# Patient Record
Sex: MALE | Race: OTHER | Employment: FULL TIME | URBAN - NONMETROPOLITAN AREA
[De-identification: names, ages, dates, MRNs, and addresses within clinical notes are randomized per-mention and may not be internally consistent; named-entity substitution may affect disease eponyms.]

---

## 2017-01-10 ENCOUNTER — OFFICE VISIT (OUTPATIENT)
Dept: PRIMARY CARE CLINIC | Age: 27
End: 2017-01-10

## 2017-01-10 VITALS
HEART RATE: 80 BPM | WEIGHT: 188.6 LBS | HEIGHT: 72 IN | BODY MASS INDEX: 25.55 KG/M2 | RESPIRATION RATE: 16 BRPM | SYSTOLIC BLOOD PRESSURE: 126 MMHG | DIASTOLIC BLOOD PRESSURE: 78 MMHG | TEMPERATURE: 98.4 F | OXYGEN SATURATION: 99 %

## 2017-01-10 DIAGNOSIS — S29.012A UPPER BACK STRAIN, INITIAL ENCOUNTER: Primary | ICD-10-CM

## 2017-01-10 PROCEDURE — 99213 OFFICE O/P EST LOW 20 MIN: CPT | Performed by: NURSE PRACTITIONER

## 2017-01-10 RX ORDER — CYCLOBENZAPRINE HCL 10 MG
10 TABLET ORAL 3 TIMES DAILY PRN
Qty: 30 TABLET | Refills: 0 | Status: SHIPPED | OUTPATIENT
Start: 2017-01-10 | End: 2018-08-03 | Stop reason: ALTCHOICE

## 2017-01-10 RX ORDER — PREDNISONE 20 MG/1
20 TABLET ORAL DAILY
Qty: 10 TABLET | Refills: 0 | Status: SHIPPED | OUTPATIENT
Start: 2017-01-10 | End: 2017-01-20

## 2017-01-10 ASSESSMENT — ENCOUNTER SYMPTOMS
BACK PAIN: 1
RESPIRATORY NEGATIVE: 1

## 2018-05-09 ENCOUNTER — HOSPITAL ENCOUNTER (OUTPATIENT)
Dept: LAB | Age: 28
Setting detail: SPECIMEN
Discharge: HOME | End: 2018-05-09
Payer: COMMERCIAL

## 2018-05-09 ENCOUNTER — OFFICE VISIT (OUTPATIENT)
Dept: FAMILY MEDICINE CLINIC | Age: 28
End: 2018-05-09
Payer: COMMERCIAL

## 2018-05-09 VITALS
DIASTOLIC BLOOD PRESSURE: 60 MMHG | HEIGHT: 72 IN | BODY MASS INDEX: 24.24 KG/M2 | SYSTOLIC BLOOD PRESSURE: 100 MMHG | HEART RATE: 72 BPM | WEIGHT: 179 LBS

## 2018-05-09 DIAGNOSIS — Z00.00 WELL ADULT EXAM: ICD-10-CM

## 2018-05-09 DIAGNOSIS — F41.0 PANIC ATTACK: ICD-10-CM

## 2018-05-09 DIAGNOSIS — Z13.220 SCREENING, LIPID: ICD-10-CM

## 2018-05-09 DIAGNOSIS — Z11.4 SCREENING FOR HIV (HUMAN IMMUNODEFICIENCY VIRUS): ICD-10-CM

## 2018-05-09 DIAGNOSIS — F41.9 ANXIETY: ICD-10-CM

## 2018-05-09 DIAGNOSIS — Z00.00 WELL ADULT EXAM: Primary | ICD-10-CM

## 2018-05-09 LAB
ALBUMIN SERPL-MCNC: 5.1 G/DL (ref 3.5–5.2)
ALBUMIN/GLOBULIN RATIO: 1.8 (ref 1–2.5)
ALP BLD-CCNC: 71 U/L (ref 40–129)
ALT SERPL-CCNC: 14 U/L (ref 5–41)
ANION GAP SERPL CALCULATED.3IONS-SCNC: 12 MMOL/L (ref 9–17)
AST SERPL-CCNC: 13 U/L
BILIRUB SERPL-MCNC: 1.99 MG/DL (ref 0.3–1.2)
BUN BLDV-MCNC: 9 MG/DL (ref 6–20)
BUN/CREAT BLD: 11 (ref 9–20)
CALCIUM SERPL-MCNC: 9.6 MG/DL (ref 8.6–10.4)
CHLORIDE BLD-SCNC: 97 MMOL/L (ref 98–107)
CHOLESTEROL/HDL RATIO: 3.2
CHOLESTEROL: 158 MG/DL
CO2: 30 MMOL/L (ref 20–31)
CREAT SERPL-MCNC: 0.8 MG/DL (ref 0.7–1.2)
GFR AFRICAN AMERICAN: >60 ML/MIN
GFR NON-AFRICAN AMERICAN: >60 ML/MIN
GFR SERPL CREATININE-BSD FRML MDRD: ABNORMAL ML/MIN/{1.73_M2}
GFR SERPL CREATININE-BSD FRML MDRD: ABNORMAL ML/MIN/{1.73_M2}
GLUCOSE BLD-MCNC: 89 MG/DL (ref 70–99)
HDLC SERPL-MCNC: 49 MG/DL
HIV AG/AB: NONREACTIVE
LDL CHOLESTEROL: 86 MG/DL (ref 0–130)
POTASSIUM SERPL-SCNC: 3.8 MMOL/L (ref 3.7–5.3)
SODIUM BLD-SCNC: 139 MMOL/L (ref 135–144)
TOTAL PROTEIN: 7.9 G/DL (ref 6.4–8.3)
TRIGL SERPL-MCNC: 114 MG/DL
VLDLC SERPL CALC-MCNC: NORMAL MG/DL (ref 1–30)

## 2018-05-09 PROCEDURE — 87389 HIV-1 AG W/HIV-1&-2 AB AG IA: CPT

## 2018-05-09 PROCEDURE — 99395 PREV VISIT EST AGE 18-39: CPT | Performed by: PHYSICIAN ASSISTANT

## 2018-05-09 PROCEDURE — 80053 COMPREHEN METABOLIC PANEL: CPT

## 2018-05-09 PROCEDURE — 80061 LIPID PANEL: CPT

## 2018-05-09 PROCEDURE — 36415 COLL VENOUS BLD VENIPUNCTURE: CPT

## 2018-05-09 RX ORDER — HYDROXYZINE PAMOATE 25 MG/1
25 CAPSULE ORAL 3 TIMES DAILY PRN
Qty: 30 CAPSULE | Refills: 1 | Status: SHIPPED | OUTPATIENT
Start: 2018-05-09 | End: 2022-01-12 | Stop reason: SDUPTHER

## 2018-05-09 RX ORDER — CITALOPRAM 20 MG/1
20 TABLET ORAL DAILY
Qty: 30 TABLET | Refills: 3 | Status: SHIPPED | OUTPATIENT
Start: 2018-05-09 | End: 2018-08-03 | Stop reason: SDUPTHER

## 2018-05-09 ASSESSMENT — PATIENT HEALTH QUESTIONNAIRE - PHQ9
1. LITTLE INTEREST OR PLEASURE IN DOING THINGS: 0
SUM OF ALL RESPONSES TO PHQ QUESTIONS 1-9: 0
SUM OF ALL RESPONSES TO PHQ9 QUESTIONS 1 & 2: 0
2. FEELING DOWN, DEPRESSED OR HOPELESS: 0

## 2018-05-11 ENCOUNTER — PATIENT MESSAGE (OUTPATIENT)
Dept: FAMILY MEDICINE CLINIC | Age: 28
End: 2018-05-11

## 2018-05-29 ENCOUNTER — PATIENT MESSAGE (OUTPATIENT)
Dept: FAMILY MEDICINE CLINIC | Age: 28
End: 2018-05-29

## 2018-08-03 ENCOUNTER — OFFICE VISIT (OUTPATIENT)
Dept: PRIMARY CARE CLINIC | Age: 28
End: 2018-08-03
Payer: COMMERCIAL

## 2018-08-03 VITALS
WEIGHT: 181 LBS | OXYGEN SATURATION: 98 % | RESPIRATION RATE: 16 BRPM | BODY MASS INDEX: 24.52 KG/M2 | HEIGHT: 72 IN | SYSTOLIC BLOOD PRESSURE: 130 MMHG | HEART RATE: 66 BPM | TEMPERATURE: 98.1 F | DIASTOLIC BLOOD PRESSURE: 70 MMHG

## 2018-08-03 DIAGNOSIS — F32.A DEPRESSION, UNSPECIFIED DEPRESSION TYPE: Primary | ICD-10-CM

## 2018-08-03 PROCEDURE — 99213 OFFICE O/P EST LOW 20 MIN: CPT | Performed by: FAMILY MEDICINE

## 2018-08-03 RX ORDER — CITALOPRAM 20 MG/1
20 TABLET ORAL DAILY
Qty: 30 TABLET | Refills: 2 | Status: SHIPPED | OUTPATIENT
Start: 2018-08-03 | End: 2018-11-01

## 2018-08-03 ASSESSMENT — ENCOUNTER SYMPTOMS
RESPIRATORY NEGATIVE: 1
EYES NEGATIVE: 1
ABDOMINAL PAIN: 0

## 2018-08-03 ASSESSMENT — PATIENT HEALTH QUESTIONNAIRE - PHQ9
2. FEELING DOWN, DEPRESSED OR HOPELESS: 2
SUM OF ALL RESPONSES TO PHQ QUESTIONS 1-9: 2
1. LITTLE INTEREST OR PLEASURE IN DOING THINGS: 0
SUM OF ALL RESPONSES TO PHQ9 QUESTIONS 1 & 2: 2

## 2018-08-03 NOTE — PROGRESS NOTES
distress. HENT:   Head: Normocephalic and atraumatic. Eyes: Conjunctivae are normal.   Neck: Normal range of motion. Pulmonary/Chest: Effort normal.   Neurological: He is alert and oriented to person, place, and time. Skin: Skin is warm and dry. No rash noted. He is not diaphoretic. No erythema. No pallor. Psychiatric: His behavior is normal. Judgment and thought content normal. He exhibits a depressed mood. Nursing note and vitals reviewed. Assessment:      Encounter Diagnosis   Name Primary?  Depression, unspecified depression type Yes           Plan:      Orders Placed This Encounter   Medications    citalopram (CELEXA) 20 MG tablet     Sig: Take 1 tablet by mouth daily     Dispense:  30 tablet     Refill:  2     Would recommend restarting citalopram as treatment for depression. He seemed to do well with this medical therapy. He is advised to stay on medication for 3 months duration. Patient can follow up with PCP if persistent issues. Could consider counseling if needed. Return  if no improvement in symptoms or if any further symptoms arise.

## 2018-08-21 ENCOUNTER — OFFICE VISIT (OUTPATIENT)
Dept: PRIMARY CARE CLINIC | Age: 28
End: 2018-08-21
Payer: COMMERCIAL

## 2018-08-21 ENCOUNTER — HOSPITAL ENCOUNTER (OUTPATIENT)
Age: 28
Setting detail: SPECIMEN
Discharge: HOME | End: 2018-08-21
Payer: COMMERCIAL

## 2018-08-21 VITALS
HEART RATE: 84 BPM | DIASTOLIC BLOOD PRESSURE: 80 MMHG | HEIGHT: 72 IN | BODY MASS INDEX: 24.52 KG/M2 | OXYGEN SATURATION: 99 % | TEMPERATURE: 97.9 F | SYSTOLIC BLOOD PRESSURE: 138 MMHG | WEIGHT: 181 LBS

## 2018-08-21 DIAGNOSIS — Z11.3 SCREENING FOR STD (SEXUALLY TRANSMITTED DISEASE): ICD-10-CM

## 2018-08-21 DIAGNOSIS — F32.A DEPRESSION, UNSPECIFIED DEPRESSION TYPE: ICD-10-CM

## 2018-08-21 DIAGNOSIS — Z11.3 SCREENING FOR STD (SEXUALLY TRANSMITTED DISEASE): Primary | ICD-10-CM

## 2018-08-21 PROCEDURE — 86780 TREPONEMA PALLIDUM: CPT

## 2018-08-21 PROCEDURE — 86803 HEPATITIS C AB TEST: CPT

## 2018-08-21 PROCEDURE — 87591 N.GONORRHOEAE DNA AMP PROB: CPT

## 2018-08-21 PROCEDURE — 87491 CHLMYD TRACH DNA AMP PROBE: CPT

## 2018-08-21 PROCEDURE — 36415 COLL VENOUS BLD VENIPUNCTURE: CPT

## 2018-08-21 PROCEDURE — 87340 HEPATITIS B SURFACE AG IA: CPT

## 2018-08-21 PROCEDURE — 99213 OFFICE O/P EST LOW 20 MIN: CPT | Performed by: FAMILY MEDICINE

## 2018-08-21 PROCEDURE — 87389 HIV-1 AG W/HIV-1&-2 AB AG IA: CPT

## 2018-08-22 LAB
HEPATITIS B SURFACE ANTIGEN: NONREACTIVE
HEPATITIS C ANTIBODY: NONREACTIVE
HIV AG/AB: NONREACTIVE
T. PALLIDUM, IGG: NONREACTIVE

## 2018-08-22 ASSESSMENT — ENCOUNTER SYMPTOMS
EYES NEGATIVE: 1
RESPIRATORY NEGATIVE: 1
GASTROINTESTINAL NEGATIVE: 1
ABDOMINAL PAIN: 0

## 2018-08-22 NOTE — PROGRESS NOTES
Subjective:      Patient ID: Milton Howard is a 24 y.o. male. Exposure to STD   Primary symptoms comment: patient is completely asymptomatic. Has been sexually active with new partners since breaking up with his girlfriend and would like to be tested for STDs. No known exposure. This is a new problem. Pertinent negatives include no abdominal pain, frequency, hematuria, hesitancy, painful intercourse or urgency. He has tried nothing for the symptoms. I had seen patient about a month ago secondary to his breakup with his girlfriend. At that time he was having increased issues with depression. We had restarted back on citalopram and he does note that he is doing better with this medical therapy. Is not having any further issues with significant depression symptoms. Tolerating the medication without difficulty. Did review patient's med list, allergies, social history,pmhx and pshx today as noted in the record. Review of Systems   Constitutional: Negative. HENT: Negative. Eyes: Negative. Respiratory: Negative. Cardiovascular: Negative. Gastrointestinal: Negative. Negative for abdominal pain. Genitourinary: Negative for frequency, hesitancy and urgency. Musculoskeletal: Negative. Skin: Negative. Neurological: Negative. Psychiatric/Behavioral: Negative. Objective:   Physical Exam   Constitutional: He is oriented to person, place, and time. He appears well-developed and well-nourished. No distress. HENT:   Head: Normocephalic and atraumatic. Eyes: Conjunctivae are normal.   Neck: Normal range of motion. Pulmonary/Chest: Effort normal.   Neurological: He is alert and oriented to person, place, and time. Skin: Skin is warm and dry. No rash noted. He is not diaphoretic. No erythema. No pallor. Psychiatric: He has a normal mood and affect. His behavior is normal. Judgment and thought content normal.   Nursing note and vitals reviewed.       Assessment:

## 2018-08-23 LAB
C. TRACHOMATIS DNA ,URINE: NEGATIVE
N. GONORRHOEAE DNA, URINE: NEGATIVE

## 2018-10-10 ENCOUNTER — E-VISIT (OUTPATIENT)
Dept: FAMILY MEDICINE CLINIC | Age: 28
End: 2018-10-10
Payer: COMMERCIAL

## 2018-10-10 DIAGNOSIS — R09.82 POST-NASAL DRIP: Primary | ICD-10-CM

## 2018-10-10 PROCEDURE — 99444 PR PHYSICIAN ONLINE EVALUATION & MANAGEMENT SERVICE: CPT | Performed by: FAMILY MEDICINE

## 2018-10-10 ASSESSMENT — LIFESTYLE VARIABLES
SMOKING_STATUS: SOMETIMES
HISTORY_OF_SMOKING: I SMOKED IN THE PAST, BUT NOT REGULARY

## 2018-11-01 ENCOUNTER — OFFICE VISIT (OUTPATIENT)
Dept: PRIMARY CARE CLINIC | Age: 28
End: 2018-11-01
Payer: COMMERCIAL

## 2018-11-01 VITALS
OXYGEN SATURATION: 99 % | HEART RATE: 84 BPM | DIASTOLIC BLOOD PRESSURE: 86 MMHG | TEMPERATURE: 98.9 F | HEIGHT: 72 IN | BODY MASS INDEX: 24.95 KG/M2 | WEIGHT: 184.2 LBS | RESPIRATION RATE: 16 BRPM | SYSTOLIC BLOOD PRESSURE: 136 MMHG

## 2018-11-01 DIAGNOSIS — H65.93 BILATERAL SEROUS OTITIS MEDIA, UNSPECIFIED CHRONICITY: Primary | ICD-10-CM

## 2018-11-01 DIAGNOSIS — R42 DIZZINESS: ICD-10-CM

## 2018-11-01 PROCEDURE — 93000 ELECTROCARDIOGRAM COMPLETE: CPT | Performed by: FAMILY MEDICINE

## 2018-11-01 PROCEDURE — 99214 OFFICE O/P EST MOD 30 MIN: CPT | Performed by: FAMILY MEDICINE

## 2018-11-01 RX ORDER — PREDNISONE 20 MG/1
20 TABLET ORAL 2 TIMES DAILY
Qty: 6 TABLET | Refills: 0 | Status: SHIPPED | OUTPATIENT
Start: 2018-11-01 | End: 2018-11-04

## 2018-11-01 RX ORDER — CETIRIZINE HYDROCHLORIDE 10 MG/1
10 TABLET ORAL DAILY PRN
COMMUNITY

## 2018-11-01 ASSESSMENT — PATIENT HEALTH QUESTIONNAIRE - PHQ9
SUM OF ALL RESPONSES TO PHQ QUESTIONS 1-9: 0
SUM OF ALL RESPONSES TO PHQ QUESTIONS 1-9: 0
1. LITTLE INTEREST OR PLEASURE IN DOING THINGS: 0
SUM OF ALL RESPONSES TO PHQ9 QUESTIONS 1 & 2: 0
2. FEELING DOWN, DEPRESSED OR HOPELESS: 0

## 2018-11-01 NOTE — PROGRESS NOTES
[facility]             [address] [facility], [place], [address]                        Telephone [phone]             Fax [phone]       Maricarmen Guy  1990  MRN:  [id number]  Date of visit:  11/1/18    Subjective:    Maricarmen Guy is a 34 y.o.  male who presents to [facility] today (11/1/18) for evaluation of:  Nasal Congestion (x couple weeks,clearing his throat frequently,lightheaded at times)      He states that he has had a flare of allergy symptoms for the past couple of weeks. He states that his throat felt very dry today. He states that he was trying to drink more water, and he felt like his throat was swollen. He denies difficulty swallowing. He states that he felt lightheaded today also. He takes Zyrtec prn for allergies. He denies any exposure to new or different foods or medications. He states that he has had some tightness in his chest occasionally. He states that this is not a new symptoms. He states that he is able to exercise without difficulty breathing, unless he overexerts himself. Current medications are:  Current Outpatient Prescriptions   Medication Sig Dispense Refill    cetirizine (ZYRTEC) 10 MG tablet Take 10 mg by mouth daily as needed for Allergies       No current facility-administered medications for this visit. He has No Known Allergies. He has the following problem list:  Patient Active Problem List   Diagnosis    Ankle fracture    Tachycardia    Elevated blood pressure reading        He  reports that he has been smoking Cigarettes.   He has never used smokeless tobacco.      Objective:    Vitals:    11/01/18 1604   BP: 136/86   Site: Left Upper Arm   Position: Sitting   Cuff Size: Large Adult   Pulse: 84   Resp: 16   Temp: 98.9 °F (37.2 °C)   SpO2: 99%   Weight: 184 lb 3.2 oz (83.6 kg)   Height: 6' (1.829 m)      SpO2: 99 %       Body mass index is

## 2019-01-03 ENCOUNTER — PATIENT MESSAGE (OUTPATIENT)
Dept: FAMILY MEDICINE CLINIC | Age: 29
End: 2019-01-03

## 2019-01-03 DIAGNOSIS — F41.9 ANXIETY: ICD-10-CM

## 2019-01-03 DIAGNOSIS — F41.0 PANIC ATTACK: ICD-10-CM

## 2019-01-04 ENCOUNTER — PATIENT MESSAGE (OUTPATIENT)
Dept: FAMILY MEDICINE CLINIC | Age: 29
End: 2019-01-04

## 2019-01-04 DIAGNOSIS — F41.0 PANIC ATTACK: ICD-10-CM

## 2019-01-04 DIAGNOSIS — F41.9 ANXIETY: ICD-10-CM

## 2019-01-04 RX ORDER — HYDROXYZINE PAMOATE 25 MG/1
25 CAPSULE ORAL 3 TIMES DAILY PRN
Qty: 30 CAPSULE | Refills: 1 | OUTPATIENT
Start: 2019-01-04 | End: 2019-02-03

## 2019-01-04 RX ORDER — CITALOPRAM 20 MG/1
20 TABLET ORAL DAILY
Qty: 30 TABLET | Refills: 2 | Status: SHIPPED | OUTPATIENT
Start: 2019-01-04 | End: 2019-04-15

## 2019-04-15 ENCOUNTER — HOSPITAL ENCOUNTER (OUTPATIENT)
Dept: GENERAL RADIOLOGY | Age: 29
Discharge: HOME | End: 2019-04-17
Payer: COMMERCIAL

## 2019-04-15 ENCOUNTER — OFFICE VISIT (OUTPATIENT)
Dept: PRIMARY CARE CLINIC | Age: 29
End: 2019-04-15
Payer: COMMERCIAL

## 2019-04-15 ENCOUNTER — HOSPITAL ENCOUNTER (OUTPATIENT)
Age: 29
Discharge: HOME | End: 2019-04-17
Payer: COMMERCIAL

## 2019-04-15 VITALS
HEART RATE: 74 BPM | BODY MASS INDEX: 26.18 KG/M2 | DIASTOLIC BLOOD PRESSURE: 60 MMHG | SYSTOLIC BLOOD PRESSURE: 120 MMHG | TEMPERATURE: 98 F | OXYGEN SATURATION: 98 % | WEIGHT: 193 LBS

## 2019-04-15 DIAGNOSIS — M79.671 FOOT PAIN, RIGHT: ICD-10-CM

## 2019-04-15 DIAGNOSIS — S90.31XA CONTUSION OF RIGHT FOOT, INITIAL ENCOUNTER: ICD-10-CM

## 2019-04-15 DIAGNOSIS — M79.671 FOOT PAIN, RIGHT: Primary | ICD-10-CM

## 2019-04-15 PROCEDURE — 73630 X-RAY EXAM OF FOOT: CPT

## 2019-04-15 PROCEDURE — 99213 OFFICE O/P EST LOW 20 MIN: CPT | Performed by: FAMILY MEDICINE

## 2019-04-15 NOTE — PROGRESS NOTES
[facility]             [address] [facility], [place], [address]                        Telephone [phone]             Fax [phone]       Radha Mathur  1990  MRN:  [id number]  Date of visit:  4/15/2019     Subjective:    Radha Mathur is a 34 y.o.  male who presents to [address] [place] [address] Urgent Care today (4/15/2019) for evaluation of:  Foot Pain (right foot )      He states that he was golfing 3/13/2019, and he used a golf club to hit a tree branch out of the way, the club struck the golf cart, and then hit his right foot. He has had swelling and bruising of the right foot. He has the following problem list:  Patient Active Problem List   Diagnosis    Ankle fracture    Tachycardia    Elevated blood pressure reading        Current medications are:  Outpatient Medications Marked as Taking for the 4/15/19 encounter (Office Visit) with Angel Astudillo MD   Medication Sig Dispense Refill    cetirizine (ZYRTEC) 10 MG tablet Take 10 mg by mouth daily as needed for Allergies          He has No Known Allergies. He  reports that he has been smoking cigarettes. He has never used smokeless tobacco.      Objective:    Vitals:    04/15/19 1742   BP: 120/60   Site: Left Upper Arm   Position: Sitting   Cuff Size: Large Adult   Pulse: 74   Temp: 98 °F (36.7 °C)   TempSrc: Tympanic   SpO2: 98%   Weight: 193 lb (87.5 kg)     Body mass index is 26.18 kg/m². Well-nourished, well-developed  male, in no acute distress. The right foot has normal range of motion. There is moderate tenderness to palpation of the lateral right foot. There is no significant swelling of the right foot. There is some ecchymosis near the base of the toes. X-ray results were reviewed with the patient:  Xr Foot Right (min 3 Views)    Result Date: 4/15/2019  EXAMINATION: 3 XRAY VIEWS OF THE RIGHT FOOT 4/15/2019 6:38 pm COMPARISON: None.  HISTORY:

## 2019-08-13 ENCOUNTER — OFFICE VISIT (OUTPATIENT)
Dept: PRIMARY CARE CLINIC | Age: 29
End: 2019-08-13
Payer: COMMERCIAL

## 2019-08-13 VITALS
SYSTOLIC BLOOD PRESSURE: 122 MMHG | BODY MASS INDEX: 27.06 KG/M2 | HEIGHT: 72 IN | RESPIRATION RATE: 18 BRPM | OXYGEN SATURATION: 99 % | TEMPERATURE: 97.3 F | WEIGHT: 199.8 LBS | DIASTOLIC BLOOD PRESSURE: 84 MMHG | HEART RATE: 69 BPM

## 2019-08-13 DIAGNOSIS — S90.512A ABRASION OF LEFT ANKLE, INITIAL ENCOUNTER: Primary | ICD-10-CM

## 2019-08-13 PROCEDURE — 99213 OFFICE O/P EST LOW 20 MIN: CPT | Performed by: PHYSICIAN ASSISTANT

## 2019-08-13 RX ORDER — SULFAMETHOXAZOLE AND TRIMETHOPRIM 800; 160 MG/1; MG/1
1 TABLET ORAL 2 TIMES DAILY
Qty: 20 TABLET | Refills: 0 | Status: SHIPPED | OUTPATIENT
Start: 2019-08-13 | End: 2019-08-23

## 2019-08-13 ASSESSMENT — ENCOUNTER SYMPTOMS
RESPIRATORY NEGATIVE: 1
COLOR CHANGE: 1

## 2019-09-21 ENCOUNTER — OFFICE VISIT (OUTPATIENT)
Dept: PRIMARY CARE CLINIC | Age: 29
End: 2019-09-21
Payer: COMMERCIAL

## 2019-09-21 VITALS
HEART RATE: 56 BPM | TEMPERATURE: 97.6 F | HEIGHT: 72 IN | SYSTOLIC BLOOD PRESSURE: 104 MMHG | OXYGEN SATURATION: 99 % | WEIGHT: 197 LBS | DIASTOLIC BLOOD PRESSURE: 60 MMHG | BODY MASS INDEX: 26.68 KG/M2

## 2019-09-21 DIAGNOSIS — S81.801A TRAUMATIC OPEN WOUND OF RIGHT LOWER LEG WITH INFECTION, INITIAL ENCOUNTER: Primary | ICD-10-CM

## 2019-09-21 DIAGNOSIS — L08.9 TRAUMATIC OPEN WOUND OF RIGHT LOWER LEG WITH INFECTION, INITIAL ENCOUNTER: Primary | ICD-10-CM

## 2019-09-21 PROCEDURE — 99213 OFFICE O/P EST LOW 20 MIN: CPT | Performed by: FAMILY MEDICINE

## 2019-09-21 RX ORDER — CEPHALEXIN 500 MG/1
500 CAPSULE ORAL 3 TIMES DAILY
Qty: 30 CAPSULE | Refills: 0 | Status: SHIPPED | OUTPATIENT
Start: 2019-09-21 | End: 2019-11-06 | Stop reason: ALTCHOICE

## 2019-09-28 ASSESSMENT — ENCOUNTER SYMPTOMS
COLOR CHANGE: 0
EYES NEGATIVE: 1
RESPIRATORY NEGATIVE: 1
GASTROINTESTINAL NEGATIVE: 1

## 2019-11-06 ENCOUNTER — OFFICE VISIT (OUTPATIENT)
Dept: PRIMARY CARE CLINIC | Age: 29
End: 2019-11-06
Payer: COMMERCIAL

## 2019-11-06 VITALS
OXYGEN SATURATION: 99 % | HEART RATE: 88 BPM | WEIGHT: 195 LBS | DIASTOLIC BLOOD PRESSURE: 80 MMHG | TEMPERATURE: 97.6 F | BODY MASS INDEX: 26.45 KG/M2 | SYSTOLIC BLOOD PRESSURE: 118 MMHG

## 2019-11-06 DIAGNOSIS — K21.9 GASTROESOPHAGEAL REFLUX DISEASE, ESOPHAGITIS PRESENCE NOT SPECIFIED: Primary | ICD-10-CM

## 2019-11-06 PROCEDURE — 99213 OFFICE O/P EST LOW 20 MIN: CPT | Performed by: NURSE PRACTITIONER

## 2019-11-06 RX ORDER — FAMOTIDINE 20 MG/1
20 TABLET, FILM COATED ORAL 2 TIMES DAILY
Qty: 60 TABLET | Refills: 1 | Status: SHIPPED | OUTPATIENT
Start: 2019-11-06 | End: 2021-02-25

## 2019-11-06 ASSESSMENT — ENCOUNTER SYMPTOMS
BLOOD IN STOOL: 0
VOMITING: 0
NAUSEA: 0
RESPIRATORY NEGATIVE: 1
BELCHING: 1
CONSTIPATION: 0
ABDOMINAL PAIN: 1

## 2020-03-11 ENCOUNTER — OFFICE VISIT (OUTPATIENT)
Dept: FAMILY MEDICINE CLINIC | Age: 30
End: 2020-03-11
Payer: COMMERCIAL

## 2020-03-11 VITALS
HEART RATE: 64 BPM | BODY MASS INDEX: 27.5 KG/M2 | WEIGHT: 203 LBS | DIASTOLIC BLOOD PRESSURE: 70 MMHG | SYSTOLIC BLOOD PRESSURE: 132 MMHG | HEIGHT: 72 IN

## 2020-03-11 PROCEDURE — 99213 OFFICE O/P EST LOW 20 MIN: CPT | Performed by: PHYSICIAN ASSISTANT

## 2020-03-11 RX ORDER — OMEPRAZOLE 40 MG/1
40 CAPSULE, DELAYED RELEASE ORAL
Qty: 30 CAPSULE | Refills: 1 | Status: SHIPPED | OUTPATIENT
Start: 2020-03-11 | End: 2021-02-25 | Stop reason: ALTCHOICE

## 2020-03-11 ASSESSMENT — ENCOUNTER SYMPTOMS
BELCHING: 1
ABDOMINAL PAIN: 1
VOMITING: 0
NAUSEA: 1
RESPIRATORY NEGATIVE: 1
DIARRHEA: 1
HEMATOCHEZIA: 1

## 2020-03-11 ASSESSMENT — PATIENT HEALTH QUESTIONNAIRE - PHQ9
SUM OF ALL RESPONSES TO PHQ QUESTIONS 1-9: 0
SUM OF ALL RESPONSES TO PHQ QUESTIONS 1-9: 0
2. FEELING DOWN, DEPRESSED OR HOPELESS: 0
SUM OF ALL RESPONSES TO PHQ9 QUESTIONS 1 & 2: 0
1. LITTLE INTEREST OR PLEASURE IN DOING THINGS: 0

## 2020-03-11 NOTE — PROGRESS NOTES
Subjective:      Patient ID: Litzy Woodard is a 34 y.o. male. Abdominal Pain   This is a new problem. The current episode started more than 1 month ago. The problem has been waxing and waning. The pain is located in the RUQ. Associated symptoms include belching, diarrhea, hematochezia and nausea. Pertinent negatives include no anorexia or vomiting. The pain is aggravated by certain positions. He has tried H2 blockers for the symptoms. The treatment provided no relief. Review of Systems   HENT: Negative. Respiratory: Negative. Cardiovascular: Negative. Gastrointestinal: Positive for abdominal pain, diarrhea, hematochezia and nausea. Negative for anorexia and vomiting. Objective:   Physical Exam  HENT:      Head: Normocephalic. Mouth/Throat:      Mouth: Mucous membranes are moist.   Eyes:      Pupils: Pupils are equal, round, and reactive to light. Neck:      Musculoskeletal: Neck supple. Cardiovascular:      Rate and Rhythm: Normal rate and regular rhythm. Pulmonary:      Effort: Pulmonary effort is normal.      Breath sounds: Normal breath sounds. Abdominal:      General: Abdomen is flat. Tenderness: There is abdominal tenderness (epigastrium). Skin:     General: Skin is warm. Neurological:      General: No focal deficit present. Mental Status: He is alert. Psychiatric:         Mood and Affect: Mood normal.         Assessment:      1. RUQ pain    2. Diarrhea, unspecified type          Plan:      Start omeprazole 40 mg daily. Schedule RUQ ultrasound. Monitor dietary intake. Patient will be contacted with ultrasound results. Follow-up after testing/sooner PRN.         SASHA Grayson

## 2020-03-12 ENCOUNTER — HOSPITAL ENCOUNTER (OUTPATIENT)
Dept: INTERVENTIONAL RADIOLOGY/VASCULAR | Age: 30
Discharge: HOME | End: 2020-03-14
Payer: COMMERCIAL

## 2020-03-12 PROCEDURE — 76705 ECHO EXAM OF ABDOMEN: CPT

## 2021-01-14 ENCOUNTER — OFFICE VISIT (OUTPATIENT)
Dept: FAMILY MEDICINE CLINIC | Age: 31
End: 2021-01-14
Payer: COMMERCIAL

## 2021-01-14 VITALS
BODY MASS INDEX: 28.06 KG/M2 | SYSTOLIC BLOOD PRESSURE: 108 MMHG | WEIGHT: 207.2 LBS | HEART RATE: 76 BPM | HEIGHT: 72 IN | DIASTOLIC BLOOD PRESSURE: 70 MMHG

## 2021-01-14 DIAGNOSIS — R00.2 PALPITATIONS: Primary | ICD-10-CM

## 2021-01-14 DIAGNOSIS — F41.9 ANXIETY: ICD-10-CM

## 2021-01-14 PROCEDURE — 99213 OFFICE O/P EST LOW 20 MIN: CPT | Performed by: PHYSICIAN ASSISTANT

## 2021-01-14 RX ORDER — CITALOPRAM 20 MG/1
20 TABLET ORAL DAILY
Qty: 30 TABLET | Refills: 1 | Status: SHIPPED | OUTPATIENT
Start: 2021-01-14 | End: 2021-02-25 | Stop reason: SDUPTHER

## 2021-01-14 SDOH — HEALTH STABILITY: MENTAL HEALTH: HOW OFTEN DO YOU HAVE A DRINK CONTAINING ALCOHOL?: 2-4 TIMES A MONTH

## 2021-01-14 ASSESSMENT — PATIENT HEALTH QUESTIONNAIRE - PHQ9: SUM OF ALL RESPONSES TO PHQ9 QUESTIONS 1 & 2: 2

## 2021-01-14 ASSESSMENT — ENCOUNTER SYMPTOMS: SHORTNESS OF BREATH: 0

## 2021-01-14 NOTE — PROGRESS NOTES
Subjective:      Patient ID: Rosalind Verduzco is a 31 y.o. male. Palpitations   This is a new problem. The current episode started 1 to 4 weeks ago. The problem has been resolved. The symptoms are aggravated by alcohol. Pertinent negatives include no dizziness, shortness of breath or syncope. He has tried nothing for the symptoms. Patient is seen in the office for [facility] ER follow-up. Patient was seen in [facility] ER for palpitations. Patient says that his heart rate has slowed by the time of his visit to the ER. Review of Systems   Constitutional: Negative. HENT: Negative. Respiratory: Negative for shortness of breath. Cardiovascular: Positive for palpitations. Negative for syncope. Neurological: Negative for dizziness. Objective:   Physical Exam  HENT:      Head: Normocephalic. Right Ear: Tympanic membrane normal.      Left Ear: Tympanic membrane normal.      Mouth/Throat:      Mouth: Mucous membranes are moist.   Cardiovascular:      Rate and Rhythm: Normal rate. Pulmonary:      Effort: Pulmonary effort is normal.   Skin:     General: Skin is warm and dry. Neurological:      General: No focal deficit present. Mental Status: He is alert. Psychiatric:         Mood and Affect: Mood normal.         Assessment:      1. Palpitations    2. Anxiety          Plan:      ER records reviewed. Schedule 48 hour Holter monitor. Avoid caffeine and decongestants. Restart citalopram.  Coping strategies discussed. Follow-up in 4-6 weeks/sooner PRN.         SASHA Morrow

## 2021-01-18 ENCOUNTER — HOSPITAL ENCOUNTER (OUTPATIENT)
Dept: NON INVASIVE DIAGNOSTICS | Age: 31
Discharge: HOME | End: 2021-01-18
Payer: COMMERCIAL

## 2021-01-18 DIAGNOSIS — R00.2 PALPITATIONS: ICD-10-CM

## 2021-01-18 PROCEDURE — 93225 XTRNL ECG REC<48 HRS REC: CPT

## 2021-01-18 PROCEDURE — 93226 XTRNL ECG REC<48 HR SCAN A/R: CPT

## 2021-01-20 ENCOUNTER — HOSPITAL ENCOUNTER (OUTPATIENT)
Dept: NON INVASIVE DIAGNOSTICS | Age: 31
Discharge: HOME | End: 2021-01-20
Payer: COMMERCIAL

## 2021-02-25 ENCOUNTER — OFFICE VISIT (OUTPATIENT)
Dept: FAMILY MEDICINE CLINIC | Age: 31
End: 2021-02-25
Payer: COMMERCIAL

## 2021-02-25 VITALS
HEART RATE: 72 BPM | HEIGHT: 72 IN | WEIGHT: 201.9 LBS | SYSTOLIC BLOOD PRESSURE: 122 MMHG | BODY MASS INDEX: 27.35 KG/M2 | DIASTOLIC BLOOD PRESSURE: 70 MMHG

## 2021-02-25 DIAGNOSIS — F41.9 ANXIETY: ICD-10-CM

## 2021-02-25 PROCEDURE — 99213 OFFICE O/P EST LOW 20 MIN: CPT | Performed by: PHYSICIAN ASSISTANT

## 2021-02-25 RX ORDER — CITALOPRAM 20 MG/1
20 TABLET ORAL DAILY
Qty: 30 TABLET | Refills: 5 | Status: SHIPPED | OUTPATIENT
Start: 2021-02-25 | End: 2022-01-12 | Stop reason: SDUPTHER

## 2021-02-25 SDOH — ECONOMIC STABILITY: FOOD INSECURITY: WITHIN THE PAST 12 MONTHS, THE FOOD YOU BOUGHT JUST DIDN'T LAST AND YOU DIDN'T HAVE MONEY TO GET MORE.: NEVER TRUE

## 2021-02-25 SDOH — ECONOMIC STABILITY: INCOME INSECURITY: HOW HARD IS IT FOR YOU TO PAY FOR THE VERY BASICS LIKE FOOD, HOUSING, MEDICAL CARE, AND HEATING?: NOT HARD AT ALL

## 2021-02-25 SDOH — ECONOMIC STABILITY: TRANSPORTATION INSECURITY
IN THE PAST 12 MONTHS, HAS THE LACK OF TRANSPORTATION KEPT YOU FROM MEDICAL APPOINTMENTS OR FROM GETTING MEDICATIONS?: NO

## 2021-02-25 ASSESSMENT — ENCOUNTER SYMPTOMS: RESPIRATORY NEGATIVE: 1

## 2021-02-25 NOTE — PROGRESS NOTES
Subjective:      Patient ID: Rosanna Akhtar is a 31 y.o. male. Mental Health Problem  The primary symptoms include dysphoric mood. The current episode started more than 1 month ago. This is a chronic problem. The onset of the illness is precipitated by emotional stress. The degree of incapacity that he is experiencing as a consequence of his illness is mild. Additional symptoms of the illness include agitation. Additional symptoms of the illness do not include anhedonia, insomnia, psychomotor retardation or decreased need for sleep. He does not admit to suicidal ideas. He does not have a plan to attempt suicide. Risk factors that are present for mental illness include a history of mental illness. Review of Systems   Constitutional: Negative. HENT: Negative. Respiratory: Negative. Cardiovascular: Negative. Psychiatric/Behavioral: Positive for agitation and dysphoric mood. The patient does not have insomnia. Objective:   Physical Exam  HENT:      Head: Normocephalic. Right Ear: Tympanic membrane normal.      Left Ear: Tympanic membrane normal.   Cardiovascular:      Rate and Rhythm: Normal rate. Pulmonary:      Effort: Pulmonary effort is normal.      Breath sounds: Normal breath sounds. Skin:     General: Skin is warm. Neurological:      General: No focal deficit present. Mental Status: He is alert and oriented to person, place, and time. Psychiatric:         Mood and Affect: Mood normal.         Assessment:      1. Anxiety          Plan:      Continue current dose of citalopram.  Healthy diet and routine exercise. Coping strategies discussed.   Follow-up in four months/sooner SASHA Grider

## 2022-01-12 ENCOUNTER — OFFICE VISIT (OUTPATIENT)
Dept: FAMILY MEDICINE CLINIC | Age: 32
End: 2022-01-12
Payer: COMMERCIAL

## 2022-01-12 VITALS
SYSTOLIC BLOOD PRESSURE: 122 MMHG | DIASTOLIC BLOOD PRESSURE: 70 MMHG | HEART RATE: 76 BPM | WEIGHT: 197 LBS | BODY MASS INDEX: 26.68 KG/M2 | HEIGHT: 72 IN

## 2022-01-12 DIAGNOSIS — F41.0 PANIC ATTACK: ICD-10-CM

## 2022-01-12 DIAGNOSIS — F41.9 ANXIETY: ICD-10-CM

## 2022-01-12 PROCEDURE — 99213 OFFICE O/P EST LOW 20 MIN: CPT | Performed by: PHYSICIAN ASSISTANT

## 2022-01-12 RX ORDER — CITALOPRAM 20 MG/1
20 TABLET ORAL DAILY
Qty: 30 TABLET | Refills: 5 | Status: SHIPPED | OUTPATIENT
Start: 2022-01-12 | End: 2022-07-12 | Stop reason: SDUPTHER

## 2022-01-12 RX ORDER — HYDROXYZINE PAMOATE 25 MG/1
25 CAPSULE ORAL 3 TIMES DAILY PRN
Qty: 30 CAPSULE | Refills: 1 | Status: SHIPPED | OUTPATIENT
Start: 2022-01-12 | End: 2022-02-11

## 2022-01-12 ASSESSMENT — PATIENT HEALTH QUESTIONNAIRE - PHQ9
2. FEELING DOWN, DEPRESSED OR HOPELESS: 0
1. LITTLE INTEREST OR PLEASURE IN DOING THINGS: 2
SUM OF ALL RESPONSES TO PHQ QUESTIONS 1-9: 2
SUM OF ALL RESPONSES TO PHQ QUESTIONS 1-9: 2
SUM OF ALL RESPONSES TO PHQ9 QUESTIONS 1 & 2: 2
SUM OF ALL RESPONSES TO PHQ QUESTIONS 1-9: 2
SUM OF ALL RESPONSES TO PHQ QUESTIONS 1-9: 2

## 2022-01-12 ASSESSMENT — ENCOUNTER SYMPTOMS: RESPIRATORY NEGATIVE: 1

## 2022-01-12 NOTE — PROGRESS NOTES
Subjective:      Patient ID: Kasi Olivera is a 36 y.o. male. Mental Health Problem  The primary symptoms do not include dysphoric mood. The current episode started more than 1 month ago. This is a recurrent problem. The degree of incapacity that he is experiencing as a consequence of his illness is moderate. Additional symptoms of the illness include insomnia, agitation and distractible. Additional symptoms of the illness do not include anhedonia, fatigue or psychomotor retardation. He does not admit to suicidal ideas. He does not have a plan to attempt suicide. Risk factors that are present for mental illness include a history of mental illness. Review of Systems   Constitutional: Negative for fatigue. HENT: Negative. Respiratory: Negative. Cardiovascular: Negative. Psychiatric/Behavioral: Positive for agitation. Negative for dysphoric mood. The patient has insomnia. Objective:   Physical Exam  HENT:      Head: Normocephalic. Right Ear: Tympanic membrane normal.      Left Ear: Tympanic membrane normal.      Mouth/Throat:      Mouth: Mucous membranes are moist.   Eyes:      Pupils: Pupils are equal, round, and reactive to light. Cardiovascular:      Rate and Rhythm: Normal rate. Pulmonary:      Effort: Pulmonary effort is normal.      Breath sounds: Normal breath sounds. Musculoskeletal:      Cervical back: Neck supple. Skin:     General: Skin is warm. Neurological:      General: No focal deficit present. Mental Status: He is alert. Psychiatric:         Mood and Affect: Mood normal.         Assessment:      1. Anxiety    2. Panic attack          Plan:      Restart citalopram 20 mg daily. Coping strategies discussed. Vistaril PRN acute anxiety. Encouraged COVID booster. Follow-up in six months/sooner PRN.         SASHA Oliver

## 2022-03-30 ENCOUNTER — PATIENT MESSAGE (OUTPATIENT)
Dept: FAMILY MEDICINE CLINIC | Age: 32
End: 2022-03-30

## 2022-03-31 NOTE — TELEPHONE ENCOUNTER
From: Estrella Kothari  To: Nida Ashton  Sent: 3/30/2022 6:38 PM EDT  Subject: GERD and Acid Reflux     Good evening. The past couple of weeks Lary had some GERD and Acid Reflux issues. Lots of long burps even if I havent ate anything. After some meals Ill burp and have a bit of the food come up. I had taken Pepcid before and that helped but I was also wondering if there was anything I could be prescribed to help with this well. Thank you!

## 2022-07-12 ENCOUNTER — OFFICE VISIT (OUTPATIENT)
Dept: FAMILY MEDICINE CLINIC | Age: 32
End: 2022-07-12
Payer: COMMERCIAL

## 2022-07-12 VITALS
BODY MASS INDEX: 28.04 KG/M2 | HEIGHT: 72 IN | SYSTOLIC BLOOD PRESSURE: 108 MMHG | HEART RATE: 72 BPM | WEIGHT: 207 LBS | DIASTOLIC BLOOD PRESSURE: 70 MMHG

## 2022-07-12 DIAGNOSIS — F41.9 ANXIETY: ICD-10-CM

## 2022-07-12 PROCEDURE — 99213 OFFICE O/P EST LOW 20 MIN: CPT | Performed by: PHYSICIAN ASSISTANT

## 2022-07-12 RX ORDER — CITALOPRAM 20 MG/1
20 TABLET ORAL DAILY
Qty: 30 TABLET | Refills: 6 | Status: SHIPPED | OUTPATIENT
Start: 2022-07-12

## 2022-07-12 SDOH — ECONOMIC STABILITY: FOOD INSECURITY: WITHIN THE PAST 12 MONTHS, YOU WORRIED THAT YOUR FOOD WOULD RUN OUT BEFORE YOU GOT MONEY TO BUY MORE.: NEVER TRUE

## 2022-07-12 SDOH — ECONOMIC STABILITY: TRANSPORTATION INSECURITY
IN THE PAST 12 MONTHS, HAS LACK OF TRANSPORTATION KEPT YOU FROM MEETINGS, WORK, OR FROM GETTING THINGS NEEDED FOR DAILY LIVING?: NO

## 2022-07-12 SDOH — ECONOMIC STABILITY: FOOD INSECURITY: WITHIN THE PAST 12 MONTHS, THE FOOD YOU BOUGHT JUST DIDN'T LAST AND YOU DIDN'T HAVE MONEY TO GET MORE.: NEVER TRUE

## 2022-07-12 ASSESSMENT — ENCOUNTER SYMPTOMS
RESPIRATORY NEGATIVE: 1
GASTROINTESTINAL NEGATIVE: 1

## 2022-07-12 ASSESSMENT — SOCIAL DETERMINANTS OF HEALTH (SDOH): HOW HARD IS IT FOR YOU TO PAY FOR THE VERY BASICS LIKE FOOD, HOUSING, MEDICAL CARE, AND HEATING?: NOT HARD AT ALL

## 2022-07-12 NOTE — PROGRESS NOTES
Subjective:      Patient ID: Darleen Morales is a 36 y.o. male. Mental Health Problem  The primary symptoms do not include dysphoric mood. The current episode started more than 1 month ago. This is a chronic problem. The onset of the illness is precipitated by emotional stress. The degree of incapacity that he is experiencing as a consequence of his illness is mild. Additional symptoms of the illness include agitation (situational, improved significantly). Additional symptoms of the illness do not include anhedonia, insomnia, fatigue, feelings of worthlessness, distractible or poor judgment. He does not admit to suicidal ideas. He does not have a plan to attempt suicide. Risk factors that are present for mental illness include a history of mental illness. Review of Systems   Constitutional: Negative for fatigue. HENT: Negative. Respiratory: Negative. Cardiovascular: Negative. Gastrointestinal: Negative. Psychiatric/Behavioral: Positive for agitation (situational, improved significantly). Negative for dysphoric mood. The patient does not have insomnia. Objective:   Physical Exam  HENT:      Head: Normocephalic. Right Ear: Tympanic membrane normal.      Left Ear: Tympanic membrane normal.   Cardiovascular:      Rate and Rhythm: Normal rate. Pulmonary:      Effort: Pulmonary effort is normal.      Breath sounds: Normal breath sounds. Skin:     General: Skin is warm. Neurological:      General: No focal deficit present. Mental Status: He is alert and oriented to person, place, and time. Psychiatric:         Mood and Affect: Mood normal.         Behavior: Behavior normal.         Assessment:      1. Anxiety          Plan:      Continue current dose of citalopram.  Coping strategies and self-relaxation techniques encouraged. Healthy diet and routine exercise. Follow-up in six months/sooner PRN.         SASHA Dover

## 2022-11-11 DIAGNOSIS — F41.9 ANXIETY: ICD-10-CM

## 2022-11-11 RX ORDER — CITALOPRAM 20 MG/1
20 TABLET ORAL DAILY
Qty: 30 TABLET | Refills: 2 | OUTPATIENT
Start: 2022-11-11

## 2023-01-12 ENCOUNTER — OFFICE VISIT (OUTPATIENT)
Dept: FAMILY MEDICINE CLINIC | Age: 33
End: 2023-01-12
Payer: COMMERCIAL

## 2023-01-12 ENCOUNTER — HOSPITAL ENCOUNTER (OUTPATIENT)
Age: 33
Discharge: HOME | End: 2023-01-12
Payer: COMMERCIAL

## 2023-01-12 VITALS
HEART RATE: 60 BPM | BODY MASS INDEX: 27.66 KG/M2 | DIASTOLIC BLOOD PRESSURE: 70 MMHG | WEIGHT: 204.2 LBS | HEIGHT: 72 IN | SYSTOLIC BLOOD PRESSURE: 112 MMHG

## 2023-01-12 DIAGNOSIS — Z00.00 ANNUAL PHYSICAL EXAM: ICD-10-CM

## 2023-01-12 DIAGNOSIS — Z00.00 ANNUAL PHYSICAL EXAM: Primary | ICD-10-CM

## 2023-01-12 DIAGNOSIS — F41.9 ANXIETY: ICD-10-CM

## 2023-01-12 LAB
ALBUMIN SERPL-MCNC: 5 G/DL (ref 3.5–5.2)
ALBUMIN/GLOBULIN RATIO: 2.1 (ref 1–2.5)
ALP BLD-CCNC: 62 U/L (ref 40–129)
ALT SERPL-CCNC: 42 U/L (ref 5–41)
ANION GAP SERPL CALCULATED.3IONS-SCNC: 8 MMOL/L (ref 9–17)
AST SERPL-CCNC: 27 U/L
BILIRUB SERPL-MCNC: 1.4 MG/DL (ref 0.3–1.2)
BUN BLDV-MCNC: 9 MG/DL (ref 6–20)
BUN/CREAT BLD: 11 (ref 9–20)
CALCIUM SERPL-MCNC: 9.9 MG/DL (ref 8.6–10.4)
CHLORIDE BLD-SCNC: 103 MMOL/L (ref 98–107)
CHOLESTEROL/HDL RATIO: 3.8
CHOLESTEROL: 172 MG/DL
CO2: 31 MMOL/L (ref 20–31)
CREAT SERPL-MCNC: 0.84 MG/DL (ref 0.7–1.2)
GFR SERPL CREATININE-BSD FRML MDRD: >60 ML/MIN/1.73M2
GLUCOSE BLD-MCNC: 99 MG/DL (ref 70–99)
HDLC SERPL-MCNC: 45 MG/DL
LDL CHOLESTEROL: 105 MG/DL (ref 0–130)
POTASSIUM SERPL-SCNC: 4.4 MMOL/L (ref 3.7–5.3)
SODIUM BLD-SCNC: 142 MMOL/L (ref 135–144)
TOTAL PROTEIN: 7.4 G/DL (ref 6.4–8.3)
TRIGL SERPL-MCNC: 109 MG/DL

## 2023-01-12 PROCEDURE — 80053 COMPREHEN METABOLIC PANEL: CPT

## 2023-01-12 PROCEDURE — 80061 LIPID PANEL: CPT

## 2023-01-12 PROCEDURE — G8484 FLU IMMUNIZE NO ADMIN: HCPCS | Performed by: PHYSICIAN ASSISTANT

## 2023-01-12 PROCEDURE — 99395 PREV VISIT EST AGE 18-39: CPT | Performed by: PHYSICIAN ASSISTANT

## 2023-01-12 PROCEDURE — 36415 COLL VENOUS BLD VENIPUNCTURE: CPT

## 2023-01-12 RX ORDER — CITALOPRAM 20 MG/1
20 TABLET ORAL DAILY
Qty: 30 TABLET | Refills: 6 | Status: SHIPPED | OUTPATIENT
Start: 2023-01-12

## 2023-01-12 ASSESSMENT — PATIENT HEALTH QUESTIONNAIRE - PHQ9
2. FEELING DOWN, DEPRESSED OR HOPELESS: 0
SUM OF ALL RESPONSES TO PHQ QUESTIONS 1-9: 0
SUM OF ALL RESPONSES TO PHQ QUESTIONS 1-9: 0
SUM OF ALL RESPONSES TO PHQ9 QUESTIONS 1 & 2: 0
SUM OF ALL RESPONSES TO PHQ QUESTIONS 1-9: 0
1. LITTLE INTEREST OR PLEASURE IN DOING THINGS: 0
SUM OF ALL RESPONSES TO PHQ QUESTIONS 1-9: 0

## 2023-01-12 NOTE — PROGRESS NOTES
CHIEF COMPLAINT  Chief Complaint   Patient presents with    Annual Exam       SUBJECTIVE  Arash Quiroga is a 29 y.o. male who presents to the office for annual wellness examination and follow-up of anxiety. Patient has been taking his citalopram consistently and finds this very effective for his anxiety. He gets occasional anxiety at times of nervousness. He denies any significant concerns or complaints today. ROS  All other review of systems negative, except for those noted. PAST MEDICAL HISTORY    Past Medical History:   Diagnosis Date    Anxiety        SURGICAL HISTORY    History reviewed. No pertinent surgical history. FAMILY HISTORY    Family History   Problem Relation Age of Onset    Heart Disease Maternal Grandfather        SOCIAL HISTORY    Social History     Socioeconomic History    Marital status: Single     Spouse name: None    Number of children: None    Years of education: None    Highest education level: None   Tobacco Use    Smoking status: Former     Types: Cigarettes     Quit date: 10/14/2013     Years since quittin.2    Smokeless tobacco: Never    Tobacco comments:     smokes once a month   Vaping Use    Vaping Use: Never used   Substance and Sexual Activity    Alcohol use:  Yes     Alcohol/week: 5.8 standard drinks     Types: 7 drink(s) per week     Comment: socially    Drug use: No     Social Determinants of Health     Financial Resource Strain: Low Risk     Difficulty of Paying Living Expenses: Not hard at all   Food Insecurity: No Food Insecurity    Worried About Running Out of Food in the Last Year: Never true    Ran Out of Food in the Last Year: Never true   Transportation Needs: No Transportation Needs    Lack of Transportation (Medical): No    Lack of Transportation (Non-Medical): No       MEDICATIONS  Current Outpatient Medications   Medication Sig Dispense Refill    citalopram (CELEXA) 20 MG tablet Take 1 tablet by mouth daily 30 tablet 6    cetirizine (ZYRTEC) 10 MG tablet Take 10 mg by mouth daily as needed for Allergies (Patient not taking: Reported on 1/12/2022)       No current facility-administered medications for this visit. ALLERGIES  No Known Allergies    PHYSICAL EXAM:   Vital Signs: /70 (Site: Left Upper Arm, Position: Sitting, Cuff Size: Large Adult)   Pulse 60   Ht 6' (1.829 m)   Wt 204 lb 3.2 oz (92.6 kg)   BMI 27.69 kg/m²   Constitutional:  Alert and oriented x 3   HENT:  Normocephalic, Atraumatic, Bilateral external ears normal, Oropharynx moist, No oral exudates, Nose normal. Neck- Normal range of motion, No tenderness, Supple, No stridor. Eyes:  PERRL, Conjunctiva normal, No discharge. Respiratory:  Normal breath sounds, No respiratory distress. Cardiovascular:  Normal heart rate, Normal rhythm. GI:  Bowel sounds normal, Soft, No tenderness, No masses, No pulsatile masses. Musculoskeletal:  Intact distal pulses, No edema, No tenderness, No cyanosis, No clubbing. Good range of motion in all major joints. No tenderness to palpation or major deformities noted. Back- No tenderness. Integument:  Warm, Dry, No erythema, No rash. Lymphatic:  No lymphadenopathy noted. Neurologic:  Alert & oriented x 3, Normal motor function, Normal sensory function, No focal deficits noted. Psychiatric:  Affect normal, Mood normal.     RESULTS  Ordered in this encounter. FINAL DIAGNOSIS AND ORDERS   Diagnosis Orders   1. Annual physical exam  Comprehensive Metabolic Panel    Lipid Panel      2. Anxiety  citalopram (CELEXA) 20 MG tablet          ASSESSMENT & PLAN  1. Interval history reviewed with patient. Citalopram refilled for patient. Healthy diet and routine exercise. Coping strategies discussed. Follow-up in 6 months/sooner PRN.     DISCHARGE MEDS  Outpatient Encounter Medications as of 1/12/2023   Medication Sig Dispense Refill    citalopram (CELEXA) 20 MG tablet Take 1 tablet by mouth daily 30 tablet 6    [DISCONTINUED] citalopram (CELEXA) 20 MG tablet Take 1 tablet by mouth daily 30 tablet 6    cetirizine (ZYRTEC) 10 MG tablet Take 10 mg by mouth daily as needed for Allergies (Patient not taking: Reported on 1/12/2022)       No facility-administered encounter medications on file as of 1/12/2023.

## 2023-02-22 ENCOUNTER — OFFICE VISIT (OUTPATIENT)
Dept: PRIMARY CARE CLINIC | Age: 33
End: 2023-02-22
Payer: COMMERCIAL

## 2023-02-22 VITALS
WEIGHT: 203.25 LBS | HEIGHT: 72 IN | BODY MASS INDEX: 27.53 KG/M2 | TEMPERATURE: 96.9 F | SYSTOLIC BLOOD PRESSURE: 124 MMHG | OXYGEN SATURATION: 99 % | HEART RATE: 70 BPM | RESPIRATION RATE: 14 BRPM | DIASTOLIC BLOOD PRESSURE: 74 MMHG

## 2023-02-22 DIAGNOSIS — M54.42 ACUTE MIDLINE LOW BACK PAIN WITH BILATERAL SCIATICA: Primary | ICD-10-CM

## 2023-02-22 DIAGNOSIS — M54.41 ACUTE MIDLINE LOW BACK PAIN WITH BILATERAL SCIATICA: Primary | ICD-10-CM

## 2023-02-22 PROCEDURE — G8484 FLU IMMUNIZE NO ADMIN: HCPCS | Performed by: FAMILY MEDICINE

## 2023-02-22 PROCEDURE — 99213 OFFICE O/P EST LOW 20 MIN: CPT | Performed by: FAMILY MEDICINE

## 2023-02-22 PROCEDURE — G8427 DOCREV CUR MEDS BY ELIG CLIN: HCPCS | Performed by: FAMILY MEDICINE

## 2023-02-22 PROCEDURE — G8419 CALC BMI OUT NRM PARAM NOF/U: HCPCS | Performed by: FAMILY MEDICINE

## 2023-02-22 PROCEDURE — 1036F TOBACCO NON-USER: CPT | Performed by: FAMILY MEDICINE

## 2023-02-22 RX ORDER — PREDNISONE 50 MG/1
50 TABLET ORAL DAILY
Qty: 5 TABLET | Refills: 0 | Status: SHIPPED | OUTPATIENT
Start: 2023-02-22 | End: 2023-02-27

## 2023-02-22 ASSESSMENT — ENCOUNTER SYMPTOMS
BACK PAIN: 1
EYES NEGATIVE: 1
RESPIRATORY NEGATIVE: 1
GASTROINTESTINAL NEGATIVE: 1

## 2023-02-22 NOTE — PROGRESS NOTES
Subjective:      Patient ID: Davis Nagy is a 29 y.o. male. HPI  acute urgent care visit for lower back pain in the am.  Improves during the day. Worse after laying down. Lower back pain fairly constant but tolerable. Pain or ache described radiating down the anterior lateral thighs to the level of the knee. Usually occurs after lying down and then getting up. Pain into the thighs improves as he is u and walking. No recalled injury or initiating event. No prior back issues. Using some ibuprofen In the last 2 days. Past Medical History:   Diagnosis Date    Anxiety      History reviewed. No pertinent surgical history. Current Outpatient Medications   Medication Sig Dispense Refill    citalopram (CELEXA) 20 MG tablet Take 1 tablet by mouth daily (Patient not taking: Reported on 2/22/2023) 30 tablet 6    cetirizine (ZYRTEC) 10 MG tablet Take 10 mg by mouth daily as needed for Allergies (Patient not taking: Reported on 1/12/2022)       No current facility-administered medications for this visit. No Known Allergies      Review of Systems   Constitutional: Negative. HENT: Negative. Eyes: Negative. Respiratory: Negative. Cardiovascular: Negative. Gastrointestinal: Negative. Genitourinary: Negative. Musculoskeletal:  Positive for back pain. Skin: Negative. Neurological: Negative. Negative for weakness and numbness. Psychiatric/Behavioral: Negative. Objective:   Physical Exam  Constitutional:       General: He is not in acute distress. Appearance: He is well-developed. HENT:      Head: Normocephalic and atraumatic. Right Ear: External ear normal.      Left Ear: External ear normal.      Mouth/Throat:      Pharynx: No oropharyngeal exudate. Eyes:      General: No scleral icterus. Conjunctiva/sclera: Conjunctivae normal.   Neck:      Thyroid: No thyromegaly. Cardiovascular:      Rate and Rhythm: Normal rate and regular rhythm.       Heart sounds: Normal heart sounds. No murmur heard. Pulmonary:      Effort: Pulmonary effort is normal. No respiratory distress. Breath sounds: Normal breath sounds. No wheezing. Abdominal:      General: Bowel sounds are normal. There is no distension. Palpations: Abdomen is soft. Tenderness: There is no abdominal tenderness. There is no rebound. Musculoskeletal:         General: No tenderness. Normal range of motion. Cervical back: Neck supple. Back:       Comments: Pain grossly at midline ~L4 level     Skin:     General: Skin is warm and dry. Findings: No erythema or rash. Neurological:      Mental Status: He is alert and oriented to person, place, and time. Psychiatric:         Behavior: Behavior normal.         Thought Content: Thought content normal.         Judgment: Judgment normal.     /74   Pulse 70   Temp 96.9 °F (36.1 °C) (Tympanic)   Resp 14   Ht 6' (1.829 m)   Wt 203 lb 4 oz (92.2 kg)   SpO2 99%   BMI 27.57 kg/m²     Assessment:      Encounter Diagnosis   Name Primary? Acute midline low back pain with bilateral sciatica Yes     Pain worse after lying flat, pain radiating to anterior thighs, improves with walking. Concerns for spinal stenosis      Plan:      Prednisone 50 mg/day x 5 days, then resume ibuprofen. Recheck prn concerns. Consider inversion therapy.           Agatha Haynes MD

## 2023-08-17 ENCOUNTER — OFFICE VISIT (OUTPATIENT)
Dept: FAMILY MEDICINE CLINIC | Age: 33
End: 2023-08-17
Payer: COMMERCIAL

## 2023-08-17 VITALS
BODY MASS INDEX: 27.28 KG/M2 | SYSTOLIC BLOOD PRESSURE: 112 MMHG | WEIGHT: 201.4 LBS | HEART RATE: 68 BPM | HEIGHT: 72 IN | DIASTOLIC BLOOD PRESSURE: 74 MMHG

## 2023-08-17 DIAGNOSIS — F41.9 ANXIETY: ICD-10-CM

## 2023-08-17 PROCEDURE — 99213 OFFICE O/P EST LOW 20 MIN: CPT | Performed by: PHYSICIAN ASSISTANT

## 2023-08-17 RX ORDER — CITALOPRAM 20 MG/1
20 TABLET ORAL DAILY
Qty: 30 TABLET | Refills: 11 | Status: SHIPPED | OUTPATIENT
Start: 2023-08-17

## 2023-08-17 ASSESSMENT — PATIENT HEALTH QUESTIONNAIRE - PHQ9
SUM OF ALL RESPONSES TO PHQ QUESTIONS 1-9: 0
SUM OF ALL RESPONSES TO PHQ9 QUESTIONS 1 & 2: 0
SUM OF ALL RESPONSES TO PHQ QUESTIONS 1-9: 0
1. LITTLE INTEREST OR PLEASURE IN DOING THINGS: 0
2. FEELING DOWN, DEPRESSED OR HOPELESS: 0

## 2023-08-17 ASSESSMENT — ENCOUNTER SYMPTOMS: RESPIRATORY NEGATIVE: 1

## 2023-08-17 NOTE — PROGRESS NOTES
Subjective:      Patient ID: Basim Vazquez is a 29 y.o. male. Mental Health Problem  The primary symptoms do not include dysphoric mood. The current episode started more than 1 month ago. The onset of the illness is precipitated by emotional stress. The degree of incapacity that he is experiencing as a consequence of his illness is mild. Additional symptoms of the illness include agitation. Additional symptoms of the illness do not include anhedonia, insomnia, fatigue, attention impairment or poor judgment. He does not admit to suicidal ideas. He does not have a plan to attempt suicide. Risk factors that are present for mental illness include a history of mental illness. Review of Systems   Constitutional:  Negative for fatigue. HENT: Negative. Respiratory: Negative. Cardiovascular: Negative. Psychiatric/Behavioral:  Positive for agitation. Negative for dysphoric mood. The patient does not have insomnia. Objective:   Physical Exam  HENT:      Head: Normocephalic. Right Ear: Tympanic membrane normal.      Left Ear: Tympanic membrane normal.   Cardiovascular:      Rate and Rhythm: Normal rate. Pulmonary:      Effort: Pulmonary effort is normal.      Breath sounds: Normal breath sounds. Skin:     General: Skin is warm and dry. Neurological:      General: No focal deficit present. Mental Status: He is alert. Psychiatric:         Mood and Affect: Mood normal.         Behavior: Behavior normal.       Assessment:      1. Anxiety          Plan:      Patient is doing well with citalopram. Continue current dose of citalopram.  Previous laboratory studies reviewed. Healthy diet and routine exercise. Follow-up in six months for wellness examination/sooner PRN.         SASHA Saini

## 2023-12-12 ENCOUNTER — OFFICE VISIT (OUTPATIENT)
Dept: PRIMARY CARE CLINIC | Age: 33
End: 2023-12-12
Payer: COMMERCIAL

## 2023-12-12 VITALS
HEART RATE: 76 BPM | RESPIRATION RATE: 16 BRPM | TEMPERATURE: 96.9 F | BODY MASS INDEX: 28.04 KG/M2 | DIASTOLIC BLOOD PRESSURE: 68 MMHG | OXYGEN SATURATION: 100 % | WEIGHT: 207 LBS | SYSTOLIC BLOOD PRESSURE: 118 MMHG | HEIGHT: 72 IN

## 2023-12-12 DIAGNOSIS — J02.9 SORE THROAT: Primary | ICD-10-CM

## 2023-12-12 DIAGNOSIS — B34.9 ACUTE VIRAL SYNDROME: ICD-10-CM

## 2023-12-12 LAB — S PYO AG THROAT QL: NORMAL

## 2023-12-12 PROCEDURE — 87880 STREP A ASSAY W/OPTIC: CPT

## 2023-12-12 PROCEDURE — 99213 OFFICE O/P EST LOW 20 MIN: CPT

## 2023-12-12 RX ORDER — BENZONATATE 100 MG/1
100 CAPSULE ORAL 3 TIMES DAILY PRN
Qty: 30 CAPSULE | Refills: 0 | Status: SHIPPED | OUTPATIENT
Start: 2023-12-12 | End: 2023-12-22

## 2023-12-12 ASSESSMENT — ENCOUNTER SYMPTOMS
EYES NEGATIVE: 1
SORE THROAT: 1
GASTROINTESTINAL NEGATIVE: 1
VOMITING: 0
COUGH: 1
SINUS PAIN: 1
ALLERGIC/IMMUNOLOGIC NEGATIVE: 1
SINUS PRESSURE: 1
RHINORRHEA: 1
NAUSEA: 0
DIARRHEA: 0

## 2023-12-12 NOTE — PATIENT INSTRUCTIONS
Patient has signs and symptoms of upper respiratory infection / viral illness. Patient is afebrile and stable. May be treated with over the counter medications. (Sudafed, cold/ sinus)  If high blood pressure may use Corcidin OTC cold medications  Patient can use Tylenol and/or OTC cough syrup. Antibiotics are not indicated at the present time. Advised to follow up with family doctor or return to urgent care if does not get better or symptoms worsen. Pt can go to ER if high fever >102 , vomiting, breathing difficulty, lethargy. Patient/ parents understands this approach of home management and agrees with it.

## 2023-12-12 NOTE — PROGRESS NOTES
[facility] [address]  [address] [place] [address]  Dept: [phone]  Dept Fax: [phone]    Janie Puente  is a 34 y.o. male who presents today for his medical conditions/complaints as noted below. Janie Puente is c/o of   Chief Complaint   Patient presents with    URI     Sore throat \"feels like needles,\" cough, brownish/yellow nasal drainage. Denies body aches and fevers. HPI:     URI   This is a new problem. The current episode started 1 to 4 weeks ago (1 week ago with congestion and drainage). The problem has been gradually worsening. There has been no fever. Associated symptoms include congestion, coughing, rhinorrhea, sinus pain, sneezing and a sore throat. Pertinent negatives include no diarrhea, headaches, nausea or vomiting. Treatments tried: alkaseltzer day and night x 1 week. The treatment provided mild relief. Past Medical History:   Diagnosis Date    Anxiety      No past surgical history on file. Family History   Problem Relation Age of Onset    Parkinsonism Mother     Diabetes Father     Heart Disease Maternal Grandfather        Social History     Tobacco Use    Smoking status: Former     Types: Cigarettes     Quit date: 10/14/2013     Years since quitting: 10.1    Smokeless tobacco: Never    Tobacco comments:     smokes once a month   Substance Use Topics    Alcohol use: Yes     Alcohol/week: 5.8 standard drinks of alcohol     Types: 7 drink(s) per week     Comment: socially      Prior to Visit Medications    Medication Sig Taking? Authorizing Provider   citalopram (CELEXA) 20 MG tablet Take 1 tablet by mouth daily Yes Lucretia Jones, [place]     No Known Allergies    Subjective:      Review of Systems   Constitutional: Negative. Negative for activity change, appetite change, fatigue and fever.    HENT:  Positive for congestion, postnasal drip,